# Patient Record
Sex: MALE | Race: WHITE | NOT HISPANIC OR LATINO | Employment: OTHER | URBAN - METROPOLITAN AREA
[De-identification: names, ages, dates, MRNs, and addresses within clinical notes are randomized per-mention and may not be internally consistent; named-entity substitution may affect disease eponyms.]

---

## 2019-12-23 ENCOUNTER — OFFICE VISIT (OUTPATIENT)
Dept: URGENT CARE | Facility: MEDICAL CENTER | Age: 74
End: 2019-12-23
Payer: MEDICARE

## 2019-12-23 VITALS
OXYGEN SATURATION: 95 % | DIASTOLIC BLOOD PRESSURE: 73 MMHG | HEART RATE: 72 BPM | SYSTOLIC BLOOD PRESSURE: 148 MMHG | WEIGHT: 259.04 LBS | TEMPERATURE: 97.9 F | RESPIRATION RATE: 20 BRPM

## 2019-12-23 DIAGNOSIS — M46.1 SACROILIITIS (HCC): Primary | ICD-10-CM

## 2019-12-23 PROCEDURE — G0463 HOSPITAL OUTPT CLINIC VISIT: HCPCS | Performed by: FAMILY MEDICINE

## 2019-12-23 PROCEDURE — 99213 OFFICE O/P EST LOW 20 MIN: CPT | Performed by: FAMILY MEDICINE

## 2019-12-23 RX ORDER — PREDNISONE 20 MG/1
TABLET ORAL
Qty: 18 TABLET | Refills: 0 | Status: SHIPPED | OUTPATIENT
Start: 2019-12-23 | End: 2020-09-17

## 2019-12-23 RX ORDER — FLUTICASONE PROPIONATE 50 MCG
2 SPRAY, SUSPENSION (ML) NASAL DAILY
COMMUNITY
Start: 2019-12-17 | End: 2021-03-22 | Stop reason: SDUPTHER

## 2019-12-23 RX ORDER — MECLIZINE HYDROCHLORIDE 25 MG/1
25 TABLET ORAL 2 TIMES DAILY PRN
Refills: 1 | COMMUNITY
Start: 2019-11-07

## 2019-12-23 RX ORDER — ASPIRIN 81 MG/1
81 TABLET, CHEWABLE ORAL DAILY
COMMUNITY

## 2019-12-23 RX ORDER — LISINOPRIL 20 MG/1
20 TABLET ORAL 2 TIMES DAILY
COMMUNITY
Start: 2019-10-30 | End: 2021-03-22 | Stop reason: SDUPTHER

## 2019-12-23 RX ORDER — KETOROLAC TROMETHAMINE 30 MG/ML
30 INJECTION, SOLUTION INTRAMUSCULAR; INTRAVENOUS ONCE
Status: COMPLETED | OUTPATIENT
Start: 2019-12-23 | End: 2019-12-23

## 2019-12-23 RX ORDER — HYDROCHLOROTHIAZIDE 25 MG/1
25 TABLET ORAL DAILY
COMMUNITY
Start: 2019-10-30

## 2019-12-23 RX ORDER — ALLOPURINOL 300 MG/1
TABLET ORAL
COMMUNITY
Start: 2019-10-29 | End: 2020-09-22 | Stop reason: SDUPTHER

## 2019-12-23 RX ORDER — NIACIN 500 MG
500 TABLET ORAL
COMMUNITY

## 2019-12-23 RX ORDER — LIDOCAINE 50 MG/G
1 PATCH TOPICAL DAILY
Qty: 5 PATCH | Refills: 0 | Status: SHIPPED | OUTPATIENT
Start: 2019-12-23 | End: 2020-09-17

## 2019-12-23 RX ORDER — ROSUVASTATIN CALCIUM 20 MG/1
20 TABLET, COATED ORAL DAILY
COMMUNITY

## 2019-12-23 RX ADMIN — KETOROLAC TROMETHAMINE 30 MG: 30 INJECTION, SOLUTION INTRAMUSCULAR; INTRAVENOUS at 12:18

## 2019-12-23 NOTE — PROGRESS NOTES
[facility]        NAME: Jackie Billingsley is a 75 y o  male  : 1945    MRN: [id number]  DATE: 2019  TIME: 12:17 PM    Assessment and Plan   Sacroiliitis ([facility] [address] ) [M46 1]  1  Sacroiliitis (HCC)  ketorolac (TORADOL) injection 30 mg    predniSONE (DELTASONE) 20 mg tablet    lidocaine (LIDODERM) 5 %         Patient Instructions       Follow up with PCP in 3-5 days  Proceed to  ER if symptoms worsen  Chief Complaint     Chief Complaint   Patient presents with    Back Pain     Patient states he injured the muscle in his buttock 5 weeks ago  The pain is not getting any better  Patient was using a cane         History of Present Illness       50-year-old male here today with complaint of right lower back/right hip pain for the last 5 weeks  Denies any fall or trauma  However he recalls distinctly cutting down branches several weeks ago which may have triggered pain pain seems to be localized in the gluteus  Lower back  At times radiating to the buttocks  Denies numbness or weakness of the right lower extremity  He has been taking some Tylenol  He was seen by his primary care provider in [place] who prescribed oxycodone which she takes sparingly  Denies any previous back injury  Pain seems to be constant  Worse when he stands  Pain is currently 8/10  Review of Systems   Review of Systems   Musculoskeletal: Positive for arthralgias and joint swelling  Neurological: Negative            Current Medications       Current Outpatient Medications:     aspirin 81 mg chewable tablet, Chew 81 mg daily, Disp: , Rfl:     niacin 500 mg tablet, Take 500 mg by mouth daily with breakfast, Disp: , Rfl:     rosuvastatin (CRESTOR) 20 MG tablet, Take 20 mg by mouth daily, Disp: , Rfl:     allopurinol (ZYLOPRIM) 300 mg tablet, , Disp: , Rfl:     fluticasone (FLONASE) 50 mcg/act nasal spray, , Disp: , Rfl:     hydrochlorothiazide (HYDRODIURIL) 25 mg tablet, , Disp: , Rfl:    lidocaine (LIDODERM) 5 %, Apply 1 patch topically daily Remove & Discard patch within 12 hours or as directed by MD, Disp: 5 patch, Rfl: 0    lisinopril (ZESTRIL) 20 mg tablet, 5 mg, Disp: , Rfl:     meclizine (ANTIVERT) 25 mg tablet, Take 25 mg by mouth 2 (two) times a day as needed, Disp: , Rfl: 1    metoprolol tartrate (LOPRESSOR) 25 mg tablet, , Disp: , Rfl:     predniSONE (DELTASONE) 20 mg tablet, Take 3 tablets for 3 days then 2 tablets for 3 days then 1 tablet for 3 days then stop, Disp: 18 tablet, Rfl: 0    Current Facility-Administered Medications:     ketorolac (TORADOL) injection 30 mg, 30 mg, Intramuscular, Once, Braden Meyers MD    Current Allergies     Allergies as of 12/23/2019 - Reviewed 12/23/2019   Allergen Reaction Noted    Amoxicillin  12/23/2019            The following portions of the patient's history were reviewed and updated as appropriate: allergies, current medications, past family history, past medical history, past social history, past surgical history and problem list      Past Medical History:   Diagnosis Date    Gout     Hypertension     Hypertension     Renal calculi        Past Surgical History:   Procedure Laterality Date    CARDIAC PACEMAKER PLACEMENT      CORONARY ARTERY BYPASS GRAFT         History reviewed  No pertinent family history  Medications have been verified  Objective   /73   Pulse 72   Temp 97 9 °F (36 6 °C)   Resp 20   Wt 118 kg (259 lb 0 7 oz)   SpO2 95%        Physical Exam     Physical Exam   Musculoskeletal: He exhibits tenderness  LS spine:  Flexion to 30 degrees extension to 20 degrees, lateral rotation side bending 30 degrees  There is some point tenderness upon palpation of the right sacroiliac joint  Extremities:  Lower-good strength and tone, 5/5

## 2019-12-23 NOTE — PATIENT INSTRUCTIONS
Patient received Toradol 30 mg IM in the office  I prescribed prednisone tapering from 60 down to 20 mg over 9 days  Also prescribed Lidoderm patch 5% to be applied 12 hours on 12 hours off  Advised patient to apply heating pad to affected area  He is to perform light stretching activities  Follow up with PCP if symptoms persist or worsen  Acute Low Back Pain, Ambulatory Care   GENERAL INFORMATION:   Acute low back pain  is discomfort in your lower back area that lasts for less than 12 weeks  The word acute is used to describe pain that starts suddenly, worsens quickly, and lasts for a short time  Common symptoms include the following:   · Back stiffness or spasms    · Pain down the back or side of one leg    · Holding yourself in an unusual position or posture to decrease your back pain    · Not being able to find a sitting position that is comfortable    · Slow increase in your pain for 24 to 48 hours after you stress your back    · Tenderness on your lower back or severe pain when you move your back  Seek immediate care for the following symptoms:   · Severe pain    · Sudden stiffness and heaviness in both buttocks down to both legs    · Numbness or weakness in one leg, or pain in both legs    · Numbness in your genital area or across your lower back    · Unable to control your urine or bowel movements  Treatment for acute low back pain  may include any of the following:  · Medicines:      ¨ NSAIDs  help decrease swelling and pain or fever  This medicine is available with or without a doctor's order  NSAIDs can cause stomach bleeding or kidney problems in certain people  If you take blood thinner medicine, always ask your healthcare provider if NSAIDs are safe for you  Always read the medicine label and follow directions  ¨ Muscle relaxers  help decrease muscle spasms pain  ¨ Prescription pain medicine  may be given  Ask how to take this medicine safely      · Surgery  may be needed if your pain is severe and other treatments do not work  Surgery may be needed for conditions of the lumbar spine, such as herniated disc or spinal stenosis  Manage your symptoms:   · Sleep on a firm mattress  If you do not have a firm mattress, have someone move your mattress to the floor for a few days  A piece of plywood under your mattress can also help make it firmer  · Apply ice  on your lower back for 15 to 20 minutes every hour or as directed  Use an ice pack, or put crushed ice in a plastic bag  Cover it with a towel  Ice helps prevent tissue damage and decreases swelling and pain  You can alternate ice and heat  · Apply heat  on your lower back for 20 to 30 minutes every 2 hours for as many days as directed  Heat helps decrease pain and muscle spasms  · Go to physical therapy  A physical therapist teaches you exercises to help improve movement and strength, and to decrease pain  Prevent acute low back pain:   · Use proper body mechanics  ¨ Bend at the hips and knees when you  objects  Do not bend from the waist  Use your leg muscles as you lift the load  Do not use your back  Keep the object close to your chest as you lift it  Try not to twist or lift anything above your waist     ¨ Change your position often when you stand for long periods of time  Rest one foot on a small box or footrest, and then switch to the other foot often  ¨ Try not to sit for long periods of time  When you do, sit in a straight-backed chair with your feet flat on the floor  Never reach, pull, or push while you are sitting  · Exercise regularly  Warm up before you exercise  Do exercises that strengthen your back muscles  Ask about the best exercise plan for you  · Maintain a healthy weight  Ask your healthcare provider how much you should weigh  Ask him to help you create a weight loss plan if you are overweight    Follow up with your healthcare provider as directed:  Return for a follow-up visit if you still have pain after 1 to 3 weeks of treatment  You may need to visit an orthopedist if your back pain lasts more than 6 to 12 weeks  Write down your questions so you remember to ask them during your visits  CARE AGREEMENT:   You have the right to help plan your care  Learn about your health condition and how it may be treated  Discuss treatment options with your caregivers to decide what care you want to receive  You always have the right to refuse treatment  The above information is an  only  It is not intended as medical advice for individual conditions or treatments  Talk to your doctor, nurse or pharmacist before following any medical regimen to see if it is safe and effective for you  © 2014 [address] is for End User's use only and may not be sold, redistributed or otherwise used for commercial purposes  All illustrations and images included in CareNotes® are the copyrighted property of [employer]  or Ethan Myers

## 2020-05-13 LAB — EXT SARS-COV-2: NOT DETECTED

## 2020-09-17 ENCOUNTER — OFFICE VISIT (OUTPATIENT)
Dept: FAMILY MEDICINE CLINIC | Facility: CLINIC | Age: 75
End: 2020-09-17
Payer: MEDICARE

## 2020-09-17 DIAGNOSIS — R32 URINARY INCONTINENCE, UNSPECIFIED TYPE: ICD-10-CM

## 2020-09-17 DIAGNOSIS — Z95.0 PACEMAKER: ICD-10-CM

## 2020-09-17 DIAGNOSIS — I25.10 CORONARY ARTERY DISEASE INVOLVING NATIVE HEART WITHOUT ANGINA PECTORIS, UNSPECIFIED VESSEL OR LESION TYPE: ICD-10-CM

## 2020-09-17 DIAGNOSIS — Z23 NEED FOR VACCINATION: Primary | ICD-10-CM

## 2020-09-17 DIAGNOSIS — E78.2 MIXED HYPERLIPIDEMIA: ICD-10-CM

## 2020-09-17 DIAGNOSIS — Z87.39 HISTORY OF GOUT: ICD-10-CM

## 2020-09-17 DIAGNOSIS — Z87.442 HISTORY OF KIDNEY STONES: ICD-10-CM

## 2020-09-17 DIAGNOSIS — I10 ESSENTIAL HYPERTENSION: ICD-10-CM

## 2020-09-17 DIAGNOSIS — M10.9 GOUT, UNSPECIFIED CAUSE, UNSPECIFIED CHRONICITY, UNSPECIFIED SITE: ICD-10-CM

## 2020-09-17 PROCEDURE — G0008 ADMIN INFLUENZA VIRUS VAC: HCPCS | Performed by: FAMILY MEDICINE

## 2020-09-17 PROCEDURE — 90662 IIV NO PRSV INCREASED AG IM: CPT | Performed by: FAMILY MEDICINE

## 2020-09-17 PROCEDURE — 99203 OFFICE O/P NEW LOW 30 MIN: CPT | Performed by: FAMILY MEDICINE

## 2020-09-17 NOTE — ASSESSMENT & PLAN NOTE
During recent procedure in 2018, patient was found to  Have marketed bradycardia  Pacemaker was inserted at that time    He subsequently had an ICD in 2019

## 2020-09-17 NOTE — ASSESSMENT & PLAN NOTE
S/p 5 vessel CABG while living in [place]  Pt also has a pacemaker w/ ICD  He has been doing well   Continue regular f/u w/ cardiologist

## 2020-09-17 NOTE — PROGRESS NOTES
[facility]      NAME: Rico Steele  AGE: 67 y o  SEX: male  : 1945   MRN: [id number]    DATE: 2020  TIME: 11:13 AM    Assessment and Plan     Problem List Items Addressed This Visit     Coronary artery disease involving native heart without angina pectoris     S/p 5 vessel CABG while living in [place]  Pt also has a pacemaker w/ ICD  He has been doing well  Continue regular f/u w/ cardiologist         Essential hypertension      Reasonably controlled on lisinopril 20 and metoprolol 25 b i d          Mixed hyperlipidemia      Doing well on rosuvastatin 20 mg daily  Patient will forward lab results to me         Gout      Doing well on allopurinol 300 mg daily along with p r n  colchicine  Pacemaker      During recent procedure in 2018, patient was found to  Have marketed bradycardia  Pacemaker was inserted at that time  He subsequently had an ICD in 2019         History of kidney stones       Patient being followed by urology         Urinary incontinence      Doing well on Myrbetriq 50 mg daily         Relevant Medications    Mirabegron ER 50 MG TB24    RESOLVED: History of gout      Other Visit Diagnoses     Need for vaccination    -  Primary    Relevant Orders    influenza vaccine, high-dose, PF 0 7 mL (FLUZONE HIGH-DOSE) (Completed)         I reviewed patient's medical records  Patient is current with pneumonia vaccine series  Had Zostavax in 2013  He had Adacel in 2017  Pt will forward recent lab results to me  Flu shot today      Return to office in: 6 mos (?labs)    Chief Complaint     Chief Complaint   Patient presents with    Establish Care       History of Present Illness       Patient presents to establish care at our office  Recently moved from [place] to live with his sister  History of coronary artery disease  He is status post CABG in 2019 with pacemaker and ICD insertion    He currently takes lisinopril and metoprolol for blood pressure  Rosuvastatin for cholesterol  Allopurinol for gout  Myrbetriq for urinary symptoms  Patient states he had labs done recently from his physician in [place]  He will forward these results      The following portions of the patient's history were reviewed and updated as appropriate: allergies, current medications, past family history, past medical history, past social history, past surgical history and problem list     Review of Systems   Review of Systems   Respiratory: Negative  Cardiovascular: Negative  Gastrointestinal: Negative  Genitourinary: Negative  Active Problem List     Patient Active Problem List   Diagnosis    Coronary artery disease involving native heart without angina pectoris    Essential hypertension    Mixed hyperlipidemia    Gout    Pacemaker    History of kidney stones    Urinary incontinence       Objective   There were no vitals taken for this visit  Physical Exam  Cardiovascular:      Rate and Rhythm: Normal rate and regular rhythm  Heart sounds: Normal heart sounds  Comments: Carotids: no bruits  Ext: no edema  Pulmonary:      Effort: Pulmonary effort is normal  No respiratory distress  Breath sounds: No wheezing or rales  Psychiatric:         Behavior: Behavior normal          Thought Content:  Thought content normal          Pertinent Laboratory/Diagnostic Studies:  patients medical history reviewed    Current Medications     Current Outpatient Medications:     allopurinol (ZYLOPRIM) 300 mg tablet, , Disp: , Rfl:     hydrochlorothiazide (HYDRODIURIL) 25 mg tablet, , Disp: , Rfl:     lisinopril (ZESTRIL) 20 mg tablet, 5 mg, Disp: , Rfl:     meclizine (ANTIVERT) 25 mg tablet, Take 25 mg by mouth 2 (two) times a day as needed, Disp: , Rfl: 1    metoprolol tartrate (LOPRESSOR) 25 mg tablet, , Disp: , Rfl:     niacin 500 mg tablet, Take 500 mg by mouth daily with breakfast, Disp: , Rfl:     rosuvastatin (CRESTOR) 20 MG tablet, Take 20 mg by mouth daily, Disp: , Rfl:     aspirin 81 mg chewable tablet, Chew 81 mg daily, Disp: , Rfl:     fluticasone (FLONASE) 50 mcg/act nasal spray, , Disp: , Rfl:     Mirabegron ER 50 MG TB24, Take 1 tablet (50 mg total) by mouth daily, Disp: 30 tablet, Rfl: 5    Health Maintenance     Health Maintenance   Topic Date Due    Medicare Annual Wellness Visit (AWV)  1945    BMI: Adult  03/29/1962    DTaP,Tdap,and Td Vaccines (1 - Tdap) 03/29/1965    Fall Risk  03/29/2009    Pneumococcal Vaccine: 65+ Years (1 of 1 - PPSV23) 03/29/2009    Depression Screening PHQ  09/17/2021    Influenza Vaccine  Completed    HIB Vaccine  Aged Out    Hepatitis B Vaccine  Aged Out    IPV Vaccine  Aged Out    Hepatitis A Vaccine  Aged Out    Meningococcal ACWY Vaccine  Aged Out    HPV Vaccine  Aged Out     Immunization History   Administered Date(s) Administered    Influenza, high dose seasonal 0 7 mL 09/17/2020       Natasha Philippe DO  [address] [facility]

## 2020-09-22 ENCOUNTER — TELEPHONE (OUTPATIENT)
Dept: FAMILY MEDICINE CLINIC | Facility: CLINIC | Age: 75
End: 2020-09-22

## 2020-09-22 DIAGNOSIS — M10.9 GOUT, UNSPECIFIED CAUSE, UNSPECIFIED CHRONICITY, UNSPECIFIED SITE: Primary | ICD-10-CM

## 2020-09-22 RX ORDER — ALLOPURINOL 300 MG/1
300 TABLET ORAL DAILY
Qty: 30 TABLET | Refills: 1 | Status: SHIPPED | OUTPATIENT
Start: 2020-09-22 | End: 2021-02-17

## 2020-10-01 ENCOUNTER — OFFICE VISIT (OUTPATIENT)
Dept: CARDIOLOGY CLINIC | Facility: CLINIC | Age: 75
End: 2020-10-01
Payer: MEDICARE

## 2020-10-01 VITALS
BODY MASS INDEX: 34.81 KG/M2 | DIASTOLIC BLOOD PRESSURE: 82 MMHG | HEIGHT: 72 IN | SYSTOLIC BLOOD PRESSURE: 140 MMHG | HEART RATE: 60 BPM | TEMPERATURE: 97.7 F | WEIGHT: 257 LBS

## 2020-10-01 DIAGNOSIS — Z95.810 HISTORY OF IMPLANTABLE CARDIAC DEFIBRILLATOR (ICD): ICD-10-CM

## 2020-10-01 DIAGNOSIS — I10 ESSENTIAL HYPERTENSION: ICD-10-CM

## 2020-10-01 DIAGNOSIS — E78.2 MIXED HYPERLIPIDEMIA: ICD-10-CM

## 2020-10-01 DIAGNOSIS — I25.10 CORONARY ARTERY DISEASE INVOLVING NATIVE HEART WITHOUT ANGINA PECTORIS, UNSPECIFIED VESSEL OR LESION TYPE: Primary | ICD-10-CM

## 2020-10-01 DIAGNOSIS — Z95.810 PRESENCE OF BIVENTRICULAR AICD: ICD-10-CM

## 2020-10-01 DIAGNOSIS — I25.5 ISCHEMIC CARDIOMYOPATHY: ICD-10-CM

## 2020-10-01 PROBLEM — Z95.1 HX OF CABG: Status: ACTIVE | Noted: 2020-10-01

## 2020-10-01 PROBLEM — I42.9 CARDIOMYOPATHY (HCC): Status: ACTIVE | Noted: 2020-10-01

## 2020-10-01 PROCEDURE — 99204 OFFICE O/P NEW MOD 45 MIN: CPT | Performed by: INTERNAL MEDICINE

## 2020-10-01 PROCEDURE — 93000 ELECTROCARDIOGRAM COMPLETE: CPT | Performed by: INTERNAL MEDICINE

## 2020-10-01 RX ORDER — METOPROLOL SUCCINATE 25 MG/1
25 TABLET, EXTENDED RELEASE ORAL DAILY
Qty: 90 TABLET | Refills: 3 | Status: SHIPPED | OUTPATIENT
Start: 2020-10-01 | End: 2021-08-30

## 2020-10-01 RX ORDER — BIOTIN 1 MG
1 TABLET ORAL DAILY
COMMUNITY

## 2020-10-01 RX ORDER — OMEGA-3 FATTY ACIDS/FISH OIL 300-1000MG
2 CAPSULE ORAL DAILY
COMMUNITY

## 2020-10-01 RX ORDER — DIPHENOXYLATE HYDROCHLORIDE AND ATROPINE SULFATE 2.5; .025 MG/1; MG/1
1 TABLET ORAL DAILY
COMMUNITY

## 2020-11-13 ENCOUNTER — HOSPITAL ENCOUNTER (OUTPATIENT)
Dept: NON INVASIVE DIAGNOSTICS | Facility: HOSPITAL | Age: 75
Discharge: HOME | End: 2020-11-13
Attending: INTERNAL MEDICINE
Payer: MEDICARE

## 2020-11-13 DIAGNOSIS — I25.10 CORONARY ARTERY DISEASE INVOLVING NATIVE HEART WITHOUT ANGINA PECTORIS, UNSPECIFIED VESSEL OR LESION TYPE: ICD-10-CM

## 2020-11-13 PROCEDURE — C8929 TTE W OR WO FOL WCON,DOPPLER: HCPCS

## 2020-11-13 PROCEDURE — 93306 TTE W/DOPPLER COMPLETE: CPT

## 2020-11-13 RX ADMIN — PERFLUTREN 0.4 ML/MIN: 6.52 INJECTION, SUSPENSION INTRAVENOUS at 13:37

## 2020-11-18 ENCOUNTER — CONSULT (OUTPATIENT)
Dept: CARDIOLOGY CLINIC | Facility: CLINIC | Age: 75
End: 2020-11-18
Payer: MEDICARE

## 2020-11-18 ENCOUNTER — IN-CLINIC DEVICE VISIT (OUTPATIENT)
Dept: CARDIOLOGY CLINIC | Facility: CLINIC | Age: 75
End: 2020-11-18
Payer: MEDICARE

## 2020-11-18 VITALS
HEART RATE: 61 BPM | DIASTOLIC BLOOD PRESSURE: 80 MMHG | TEMPERATURE: 99.3 F | SYSTOLIC BLOOD PRESSURE: 128 MMHG | WEIGHT: 263.8 LBS | BODY MASS INDEX: 35.73 KG/M2 | HEIGHT: 72 IN

## 2020-11-18 DIAGNOSIS — I10 ESSENTIAL HYPERTENSION: ICD-10-CM

## 2020-11-18 DIAGNOSIS — I25.5 ISCHEMIC CARDIOMYOPATHY: ICD-10-CM

## 2020-11-18 DIAGNOSIS — I25.10 CORONARY ARTERY DISEASE INVOLVING NATIVE HEART WITHOUT ANGINA PECTORIS, UNSPECIFIED VESSEL OR LESION TYPE: ICD-10-CM

## 2020-11-18 DIAGNOSIS — I44.2 COMPLETE HEART BLOCK (HCC): ICD-10-CM

## 2020-11-18 DIAGNOSIS — Z95.810 HISTORY OF IMPLANTABLE CARDIAC DEFIBRILLATOR (ICD): Primary | ICD-10-CM

## 2020-11-18 DIAGNOSIS — Z95.810 PRESENCE OF AUTOMATIC CARDIOVERTER/DEFIBRILLATOR (AICD): Primary | ICD-10-CM

## 2020-11-18 DIAGNOSIS — Z95.810 PRESENCE OF BIVENTRICULAR AICD: ICD-10-CM

## 2020-11-18 DIAGNOSIS — Z95.1 HX OF CABG: ICD-10-CM

## 2020-11-18 PROCEDURE — 93000 ELECTROCARDIOGRAM COMPLETE: CPT | Performed by: INTERNAL MEDICINE

## 2020-11-18 PROCEDURE — 93284 PRGRMG EVAL IMPLANTABLE DFB: CPT | Performed by: INTERNAL MEDICINE

## 2020-11-18 PROCEDURE — 99203 OFFICE O/P NEW LOW 30 MIN: CPT | Performed by: INTERNAL MEDICINE

## 2020-12-22 ENCOUNTER — HOSPITAL ENCOUNTER (OUTPATIENT)
Dept: NON INVASIVE DIAGNOSTICS | Facility: CLINIC | Age: 75
Discharge: HOME | End: 2020-12-22
Payer: COMMERCIAL

## 2020-12-22 DIAGNOSIS — Z95.810 PRESENCE OF BIVENTRICULAR AICD: ICD-10-CM

## 2020-12-22 DIAGNOSIS — I25.10 CORONARY ARTERY DISEASE INVOLVING NATIVE HEART WITHOUT ANGINA PECTORIS, UNSPECIFIED VESSEL OR LESION TYPE: ICD-10-CM

## 2020-12-22 PROCEDURE — VASC: Performed by: SURGERY

## 2020-12-22 PROCEDURE — 93922 UPR/L XTREMITY ART 2 LEVELS: CPT

## 2021-01-13 ENCOUNTER — TELEPHONE (OUTPATIENT)
Dept: CARDIOLOGY CLINIC | Facility: CLINIC | Age: 76
End: 2021-01-13

## 2021-01-13 NOTE — TELEPHONE ENCOUNTER
Received call from pt in regards to Covid vaccine  Advised pt vaccine is not available to the public as of now, but will get information when it becomes available

## 2021-01-20 DIAGNOSIS — Z23 ENCOUNTER FOR IMMUNIZATION: ICD-10-CM

## 2021-01-22 ENCOUNTER — IMMUNIZATIONS (OUTPATIENT)
Dept: FAMILY MEDICINE CLINIC | Facility: HOSPITAL | Age: 76
End: 2021-01-22

## 2021-01-22 DIAGNOSIS — Z23 ENCOUNTER FOR IMMUNIZATION: Primary | ICD-10-CM

## 2021-01-22 PROCEDURE — 91301 SARS-COV-2 / COVID-19 MRNA VACCINE (MODERNA) 100 MCG: CPT

## 2021-01-22 PROCEDURE — 0011A SARS-COV-2 / COVID-19 MRNA VACCINE (MODERNA) 100 MCG: CPT

## 2021-02-05 ENCOUNTER — OFFICE VISIT (OUTPATIENT)
Dept: CARDIOLOGY CLINIC | Facility: CLINIC | Age: 76
End: 2021-02-05
Payer: MEDICARE

## 2021-02-05 VITALS
SYSTOLIC BLOOD PRESSURE: 122 MMHG | HEART RATE: 60 BPM | WEIGHT: 263.13 LBS | RESPIRATION RATE: 18 BRPM | DIASTOLIC BLOOD PRESSURE: 84 MMHG | TEMPERATURE: 97.3 F | BODY MASS INDEX: 35.64 KG/M2 | HEIGHT: 72 IN | OXYGEN SATURATION: 95 %

## 2021-02-05 DIAGNOSIS — E78.2 MIXED HYPERLIPIDEMIA: ICD-10-CM

## 2021-02-05 DIAGNOSIS — I25.10 CORONARY ARTERY DISEASE INVOLVING NATIVE HEART WITHOUT ANGINA PECTORIS, UNSPECIFIED VESSEL OR LESION TYPE: ICD-10-CM

## 2021-02-05 DIAGNOSIS — I25.5 ISCHEMIC CARDIOMYOPATHY: Primary | ICD-10-CM

## 2021-02-05 DIAGNOSIS — I44.2 COMPLETE HEART BLOCK (HCC): ICD-10-CM

## 2021-02-05 DIAGNOSIS — Z95.810 PRESENCE OF BIVENTRICULAR AICD: ICD-10-CM

## 2021-02-05 DIAGNOSIS — Z95.810 HISTORY OF IMPLANTABLE CARDIAC DEFIBRILLATOR (ICD): ICD-10-CM

## 2021-02-05 DIAGNOSIS — I10 ESSENTIAL HYPERTENSION: ICD-10-CM

## 2021-02-05 DIAGNOSIS — Z95.1 HX OF CABG: ICD-10-CM

## 2021-02-05 PROCEDURE — 99214 OFFICE O/P EST MOD 30 MIN: CPT | Performed by: INTERNAL MEDICINE

## 2021-02-05 RX ORDER — OXYCODONE AND ACETAMINOPHEN 10; 325 MG/1; MG/1
TABLET ORAL EVERY 6 HOURS
COMMUNITY

## 2021-02-05 RX ORDER — FLUTICASONE PROPIONATE 50 MCG
2 BLISTER, WITH INHALATION DEVICE INHALATION DAILY
COMMUNITY

## 2021-02-05 RX ORDER — DUTASTERIDE 0.5 MG/1
CAPSULE, LIQUID FILLED ORAL
COMMUNITY

## 2021-02-05 RX ORDER — FINASTERIDE 5 MG/1
TABLET, FILM COATED ORAL
COMMUNITY

## 2021-02-05 RX ORDER — FESOTERODINE FUMARATE 4 MG/1
TABLET, EXTENDED RELEASE ORAL
COMMUNITY

## 2021-02-05 RX ORDER — TAMSULOSIN HYDROCHLORIDE 0.4 MG/1
CAPSULE ORAL
COMMUNITY

## 2021-02-05 RX ORDER — SENNOSIDES 8.6 MG
CAPSULE ORAL
COMMUNITY

## 2021-02-05 NOTE — PROGRESS NOTES
Brigitte Chavez [facility]  1945  [id number]  [facility] [address]  [phone]    1  Ischemic cardiomyopathy     2  Complete heart block ([address] )     3  Coronary artery disease involving native heart without angina pectoris, unspecified vessel or lesion type     4  Essential hypertension     5  History of implantable cardiac defibrillator (ICD)     6  Mixed hyperlipidemia     7  Hx of CABG     8  Presence of biventricular AICD         Discussion/Summary:  1  Coronary artery disease status post CABG times 5 in 2019   2  Chronic combined systolic and diastolic congestive heart failure   3  Ischemic cardiomyopathy unknown recent ejection fraction as low as 30% in the past   4  Biventricular pacer/ICD   5  Hypertension   6  Hyperlipidemia   7  Paroxysmal atrial tachycardia possible AFib on prior device interrogation     recommendations:  Overall he has been doing well from a cardiac standpoint  Coronary disease stable with no complaints of angina  Functional capacity has been good  Recent ejection fraction on echocardiogram was 40%  Continue with Toprol XL and lisinopril  Blood pressure is well controlled  Lipids have been stable on his current dose of statin  Continue high-intensity Crestor  ICD checks have shown normal device function brief nonsustained VT will continue to monitor  Vascular screening was reviewed with the patient  Interval History:   65-year-old gentleman who presents to establish care with me in the office as a consultation on behalf of Dr Junie Winslow  For coronary artery disease  His history goes back to 2016 when he developed complete heart block following urologic procedure  He underwent a pacemaker implantation at that time  In 2019 his device had shown frequent nonsustained ventricular tachycardia    He underwent left heart catheterization which showed severe multivessel disease and he underwent a CABG in 2019   Ejection fraction was 25-30%  He eventually went upgrade to ICD and biventricular pacer  LV lead was placed at the time of bypass  This is an epicardial wire  Since her last visit he has been doing well  He remains fairly physically active around the house  He has been outside doing some minimal snow clearing recently and has felt good  Denies any chest pain, shortness of breath, palpitations, lightheadedness, dizziness, or syncope  There has been no lower extremity edema, PND, orthopnea  Taking all medications as prescribed          Problem List     Coronary artery disease involving native heart without angina pectoris    Essential hypertension    Mixed hyperlipidemia    Gout    Pacemaker    History of kidney stones    Urinary incontinence    Cardiomyopathy ([facility] [address] )    History of implantable cardiac defibrillator (ICD)    Presence of biventricular AICD    Hx of CABG        Past Medical History:   Diagnosis Date    Gout     Hypertension     Hypertension     Renal calculi      Social History     Socioeconomic History    Marital status:      Spouse name: Not on file    Number of children: Not on file    Years of education: Not on file    Highest education level: Not on file   Occupational History    Not on file   Social Needs    Financial resource strain: Not on file    Food insecurity     Worry: Not on file     Inability: Not on file    Transportation needs     Medical: Not on file     Non-medical: Not on file   Tobacco Use    Smoking status: Never Smoker    Smokeless tobacco: Never Used   Substance and Sexual Activity    Alcohol use: Yes     Drinks per session: 1 or 2    Drug use: Never    Sexual activity: Not on file   Lifestyle    Physical activity     Days per week: Not on file     Minutes per session: Not on file    Stress: Not on file   Relationships    Social connections     Talks on phone: Not on file     Gets together: Not on file     Attends Sikh service: Not on file     Active member of club or organization: Not on file     Attends meetings of clubs or organizations: Not on file     Relationship status: Not on file    Intimate partner violence     Fear of current or ex partner: Not on file     Emotionally abused: Not on file     Physically abused: Not on file     Forced sexual activity: Not on file   Other Topics Concern    Not on file   Social History Narrative    Not on file      History reviewed  No pertinent family history    Past Surgical History:   Procedure Laterality Date    BASAL CELL CARCINOMA EXCISION  2021    nose    CARDIAC PACEMAKER PLACEMENT      CORONARY ARTERY BYPASS GRAFT         Current Outpatient Medications:     acetaminophen (TYLENOL) 650 mg CR tablet, acetaminophen  mg tablet,extended release, Disp: , Rfl:     allopurinol (ZYLOPRIM) 300 mg tablet, Take 1 tablet (300 mg total) by mouth daily, Disp: 30 tablet, Rfl: 1    aspirin 81 mg chewable tablet, Chew 81 mg daily, Disp: , Rfl:     B Complex Vitamins (VITAMIN B COMPLEX 100 IJ), Vitamin B Complex-C capsule  daily, Disp: , Rfl:     Cholecalciferol (Vitamin D3) 25 MCG (1000 UT) CAPS, Take 1 capsule by mouth daily, Disp: , Rfl:     Docosahexaenoic Acid--270 MG CAPS, Take 2 capsules by mouth, Disp: , Rfl:     fluticasone (FLONASE) 50 mcg/act nasal spray, 2 sprays into each nostril daily , Disp: , Rfl:     hydrochlorothiazide (HYDRODIURIL) 25 mg tablet, Take 25 mg by mouth daily , Disp: , Rfl:     lisinopril (ZESTRIL) 20 mg tablet, Take 20 mg by mouth 2 (two) times a day , Disp: , Rfl:     metoprolol succinate (TOPROL-XL) 25 mg 24 hr tablet, Take 1 tablet (25 mg total) by mouth daily, Disp: 90 tablet, Rfl: 3    Mirabegron ER 50 MG TB24, Take 1 tablet (50 mg total) by mouth daily, Disp: 30 tablet, Rfl: 5    multivitamin (THERAGRAN) TABS, Take 1 tablet by mouth daily, Disp: , Rfl:     niacin 500 mg tablet, Take 500 mg by mouth daily with breakfast, Disp: , Rfl:    Omega 3 1000 MG CAPS, Take 2 capsules by mouth daily, Disp: , Rfl:     oxyCODONE-acetaminophen (Percocet)  mg per tablet, Take by mouth every 6 (six) hours, Disp: , Rfl:     rosuvastatin (CRESTOR) 20 MG tablet, Take 20 mg by mouth daily, Disp: , Rfl:     VITAMIN E PO, Take 1 tablet by mouth daily, Disp: , Rfl:     dutasteride (AVODART) 0 5 mg capsule, dutasteride 0 5 mg capsule  every three days, Disp: , Rfl:     Fesoterodine Fumarate ER 4 MG TB24, fesoterodine ER 4 mg tablet,extended release 24 hr  daily, Disp: , Rfl:     finasteride (PROSCAR) 5 mg tablet, finasteride 5 mg tablet  daily, Disp: , Rfl:     fluticasone (Flovent Diskus) 50 MCG/BLIST diskus inhaler, Inhale 2 puffs Daily, Disp: , Rfl:     meclizine (ANTIVERT) 25 mg tablet, Take 25 mg by mouth 2 (two) times a day as needed, Disp: , Rfl: 1    tamsulosin (FLOMAX) 0 4 mg, tamsulosin 0 4 mg capsule  daily, Disp: , Rfl:   Allergies   Allergen Reactions    Amoxicillin        Labs:     Chemistry    No results found for: NA, K, CL, CO2, BUN, CREATININE No results found for: CALCIUM, ALKPHOS, AST, ALT, BILITOT         No results found for: CHOL  No results found for: HDL  No results found for: LDLCALC  No results found for: TRIG  No results found for: CHOLHDL    Imaging: No results found  ECG:    A sense Bi V paced      Review of Systems   Constitution: Negative  HENT: Negative  Eyes: Negative  Cardiovascular: Negative  Respiratory: Negative  Endocrine: Negative  Hematologic/Lymphatic: Negative  Skin: Negative  Musculoskeletal: Negative  Gastrointestinal: Negative  Genitourinary: Negative  Neurological: Negative  Psychiatric/Behavioral: Negative  All other systems reviewed and are negative        Vitals:    02/05/21 0802   BP: 122/84   Pulse: 60   Resp: 18   Temp: (!) 97 3 °F (36 3 °C)   SpO2: 95%     Vitals:    02/05/21 0802   Weight: 119 kg (263 lb 2 oz)     Height: 6' (182 9 cm)   Body mass index is 35 69 kg/m²  Physical Exam:  Vital signs reviewed  General:  Alert and cooperative, appears stated age, no acute distress  HEENT:  PERRLA, EOMI, no scleral icterus, no conjunctival pallor  Neck:  No lymphadenopathy, no thyromegaly, no carotid bruits, no elevated JVP  Heart:  Regular rate and rhythm, normal S1/S2, no S3/S4, no murmur, rubs or gallops  PMI nondisplaced  Lungs:  Clear to auscultation bilaterally, no wheezes rales or rhonchi  Abdomen:  Soft, non-tender, positive bowel sounds, no rebound or guarding,   no organomegaly   Extremities:  Normal range of motion    No clubbing, cyanosis or edema   Vascular:  2+ pedal pulses  Skin:  No rashes or lesions on exposed skin  Neurologic:  Cranial nerves II-XII grossly intact without focal deficits  Psych:  Normal mood and affect

## 2021-02-17 DIAGNOSIS — M10.9 GOUT, UNSPECIFIED CAUSE, UNSPECIFIED CHRONICITY, UNSPECIFIED SITE: ICD-10-CM

## 2021-02-17 RX ORDER — ALLOPURINOL 300 MG/1
TABLET ORAL
Qty: 60 TABLET | Refills: 5 | Status: SHIPPED | OUTPATIENT
Start: 2021-02-17 | End: 2021-11-26

## 2021-02-18 ENCOUNTER — IMMUNIZATIONS (OUTPATIENT)
Dept: FAMILY MEDICINE CLINIC | Facility: HOSPITAL | Age: 76
End: 2021-02-18

## 2021-02-18 DIAGNOSIS — Z23 ENCOUNTER FOR IMMUNIZATION: Primary | ICD-10-CM

## 2021-02-18 PROCEDURE — 0012A SARS-COV-2 / COVID-19 MRNA VACCINE (MODERNA) 100 MCG: CPT

## 2021-02-18 PROCEDURE — 91301 SARS-COV-2 / COVID-19 MRNA VACCINE (MODERNA) 100 MCG: CPT

## 2021-02-25 ENCOUNTER — REMOTE DEVICE CLINIC VISIT (OUTPATIENT)
Dept: CARDIOLOGY CLINIC | Facility: CLINIC | Age: 76
End: 2021-02-25
Payer: MEDICARE

## 2021-02-25 DIAGNOSIS — Z95.810 PRESENCE OF BIVENTRICULAR AICD: Primary | ICD-10-CM

## 2021-02-25 PROCEDURE — 93296 REM INTERROG EVL PM/IDS: CPT | Performed by: INTERNAL MEDICINE

## 2021-02-25 PROCEDURE — 93295 DEV INTERROG REMOTE 1/2/MLT: CPT | Performed by: INTERNAL MEDICINE

## 2021-02-25 NOTE — PROGRESS NOTES
Results for orders placed or performed in visit on 02/25/21   Cardiac EP device report    Narrative    SJM/Bi-V ICD (DDD MODE)  [employer] TRANSMISSION: BATTERY VOLTAGE ADEQUATE (5 4 YRS)  AP 37% BVP 95%  ALL AVAILABLE LEAD PARAMETERS WITHIN NORMAL LIMITS  30 AMS EPISODES, MAX EPISODE DURATION 12 SECS W/EGRM FOR PAT  AMS BURDEN <1%  NO HIGH RATE EPISODES  PT TAKES ASA 81, METOPROLOL SUCC  CORVUE IMPEDANCE MONITORING WITHIN NORMAL LIMITS  NORMAL DEVICE FUNCTION    EBS

## 2021-03-22 DIAGNOSIS — Z88.6: ICD-10-CM

## 2021-03-22 DIAGNOSIS — I10 ESSENTIAL HYPERTENSION: Primary | ICD-10-CM

## 2021-03-22 RX ORDER — FLUTICASONE PROPIONATE 50 MCG
2 SPRAY, SUSPENSION (ML) NASAL DAILY
Qty: 1 BOTTLE | Refills: 0 | Status: SHIPPED | OUTPATIENT
Start: 2021-03-22 | End: 2021-03-29 | Stop reason: SDUPTHER

## 2021-03-22 RX ORDER — LISINOPRIL 20 MG/1
20 TABLET ORAL 2 TIMES DAILY
Qty: 30 TABLET | Refills: 0 | Status: SHIPPED | OUTPATIENT
Start: 2021-03-22 | End: 2021-03-26 | Stop reason: SDUPTHER

## 2021-03-26 DIAGNOSIS — I10 ESSENTIAL HYPERTENSION: ICD-10-CM

## 2021-03-26 RX ORDER — LISINOPRIL 20 MG/1
20 TABLET ORAL 2 TIMES DAILY
Qty: 90 TABLET | Refills: 1 | Status: SHIPPED | OUTPATIENT
Start: 2021-03-26 | End: 2021-06-03

## 2021-03-28 ENCOUNTER — TELEPHONE (OUTPATIENT)
Dept: OTHER | Facility: OTHER | Age: 76
End: 2021-03-28

## 2021-03-28 NOTE — TELEPHONE ENCOUNTER
Patient called to ask the office to send his Prescription  For Flonase 50 mcg to [place] [id number]

## 2021-03-29 DIAGNOSIS — Z88.6: ICD-10-CM

## 2021-03-29 RX ORDER — FLUTICASONE PROPIONATE 50 MCG
2 SPRAY, SUSPENSION (ML) NASAL DAILY
Qty: 3 BOTTLE | Refills: 1 | Status: SHIPPED | OUTPATIENT
Start: 2021-03-29

## 2021-04-14 ENCOUNTER — OFFICE VISIT (OUTPATIENT)
Dept: FAMILY MEDICINE CLINIC | Facility: CLINIC | Age: 76
End: 2021-04-14
Payer: MEDICARE

## 2021-04-14 VITALS
RESPIRATION RATE: 16 BRPM | HEIGHT: 72 IN | TEMPERATURE: 97.6 F | BODY MASS INDEX: 36.7 KG/M2 | OXYGEN SATURATION: 97 % | WEIGHT: 271 LBS | HEART RATE: 67 BPM | DIASTOLIC BLOOD PRESSURE: 80 MMHG | SYSTOLIC BLOOD PRESSURE: 130 MMHG

## 2021-04-14 DIAGNOSIS — I10 ESSENTIAL HYPERTENSION: ICD-10-CM

## 2021-04-14 DIAGNOSIS — R73.9 ELEVATED BLOOD SUGAR: ICD-10-CM

## 2021-04-14 DIAGNOSIS — I25.10 CORONARY ARTERY DISEASE INVOLVING NATIVE HEART WITHOUT ANGINA PECTORIS, UNSPECIFIED VESSEL OR LESION TYPE: Primary | ICD-10-CM

## 2021-04-14 DIAGNOSIS — E78.2 MIXED HYPERLIPIDEMIA: ICD-10-CM

## 2021-04-14 DIAGNOSIS — R32 URINARY INCONTINENCE, UNSPECIFIED TYPE: ICD-10-CM

## 2021-04-14 DIAGNOSIS — M10.9 GOUT, UNSPECIFIED CAUSE, UNSPECIFIED CHRONICITY, UNSPECIFIED SITE: ICD-10-CM

## 2021-04-14 DIAGNOSIS — R31.9 HEMATURIA, UNSPECIFIED TYPE: ICD-10-CM

## 2021-04-14 DIAGNOSIS — K21.9 GASTROESOPHAGEAL REFLUX DISEASE WITHOUT ESOPHAGITIS: ICD-10-CM

## 2021-04-14 DIAGNOSIS — M25.541 ARTHRALGIA OF RIGHT HAND: ICD-10-CM

## 2021-04-14 DIAGNOSIS — Z12.5 SCREENING FOR PROSTATE CANCER: ICD-10-CM

## 2021-04-14 DIAGNOSIS — Z87.442 HISTORY OF KIDNEY STONES: ICD-10-CM

## 2021-04-14 DIAGNOSIS — Z00.00 ENCOUNTER FOR ANNUAL WELLNESS EXAM IN MEDICARE PATIENT: ICD-10-CM

## 2021-04-14 DIAGNOSIS — E66.01 OBESITY, MORBID (HCC): ICD-10-CM

## 2021-04-14 DIAGNOSIS — Z95.0 PACEMAKER: ICD-10-CM

## 2021-04-14 PROCEDURE — 1123F ACP DISCUSS/DSCN MKR DOCD: CPT | Performed by: FAMILY MEDICINE

## 2021-04-14 PROCEDURE — G0439 PPPS, SUBSEQ VISIT: HCPCS | Performed by: FAMILY MEDICINE

## 2021-04-14 PROCEDURE — 99214 OFFICE O/P EST MOD 30 MIN: CPT | Performed by: FAMILY MEDICINE

## 2021-04-14 RX ORDER — OMEPRAZOLE 20 MG/1
20 CAPSULE, DELAYED RELEASE ORAL DAILY
Qty: 90 CAPSULE | Refills: 1 | Status: SHIPPED | OUTPATIENT
Start: 2021-04-14

## 2021-04-14 NOTE — ASSESSMENT & PLAN NOTE
Status post 5 vessel CABG patient also has pacemaker and ICD  Doing well without chest pain or shortness of breath    Continue follow-up with his cardiologist at [facility]

## 2021-04-14 NOTE — ASSESSMENT & PLAN NOTE
Patient has been complaining of stiffness and pain in his right hand lately  On examination there is swelling along the 3rd digit  Will sent for x-rays  Recommend starting glucosamine 1500 mg daily    Consider referral to physical therapy

## 2021-04-14 NOTE — ASSESSMENT & PLAN NOTE
Recent reflux symptoms  Patient borrowed omeprazole capsule from his sister recently which resolved symptoms  Will give Rx for 20 mg daily    Call if further problems

## 2021-04-14 NOTE — ASSESSMENT & PLAN NOTE
Blood sugar from February 20 6/1/2032  Patient has gained weight during recent trip to [place]  Discussed diet exercise    Will check A1c prior to next appointment

## 2021-04-14 NOTE — PATIENT INSTRUCTIONS
For arthritis in hand, try OTC glucosamine 1500 mg daily        Medicare Preventive Visit Patient Instructions  Thank you for completing your Welcome to Medicare Visit or Medicare Annual Wellness Visit today  Your next wellness visit will be due in one year (4/15/2022)  The screening/preventive services that you may require over the next 5-10 years are detailed below  Some tests may not apply to you based off risk factors and/or age  Screening tests ordered at today's visit but not completed yet may show as past due  Also, please note that scanned in results may not display below  Preventive Screenings:  Service Recommendations Previous Testing/Comments   Colorectal Cancer Screening  · Colonoscopy    · Fecal Occult Blood Test (FOBT)/Fecal Immunochemical Test (FIT)  · Fecal DNA/Cologuard Test  · Flexible Sigmoidoscopy Age: 54-65 years old   Colonoscopy: every 10 years (May be performed more frequently if at higher risk)  OR  FOBT/FIT: every 1 year  OR  Cologuard: every 3 years  OR  Sigmoidoscopy: every 5 years  Screening may be recommended earlier than age 48 if at higher risk for colorectal cancer  Also, an individualized decision between you and your healthcare provider will decide whether screening between the ages of 74-80 would be appropriate   Colonoscopy: Not on file  FOBT/FIT: Not on file  Cologuard: Not on file  Sigmoidoscopy: Not on file          Prostate Cancer Screening Individualized decision between patient and health care provider in men between ages of 53-78   Medicare will cover every 12 months beginning on the day after your 50th birthday PSA: No results in last 5 years     Screening Not Indicated     Hepatitis C Screening Once for adults born between [facility]  More frequently in patients at high risk for Hepatitis C Hep C Antibody: Not on file        Diabetes Screening 1-2 times per year if you're at risk for diabetes or have pre-diabetes Fasting glucose: No results in last 5 years   A1C: No results in last 5 years        Cholesterol Screening Once every 5 years if you don't have a lipid disorder  May order more often based on risk factors  Lipid panel: Not on file    Screening Not Indicated  History Lipid Disorder      Other Preventive Screenings Covered by Medicare:  1  Abdominal Aortic Aneurysm (AAA) Screening: covered once if your at risk  You're considered to be at risk if you have a family history of AAA or a male between the age of 73-68 who smoking at least 100 cigarettes in your lifetime  2  Lung Cancer Screening: covers low dose CT scan once per year if you meet all of the following conditions: (1) Age 50-69; (2) No signs or symptoms of lung cancer; (3) Current smoker or have quit smoking within the last 15 years; (4) You have a tobacco smoking history of at least 30 pack years (packs per day x number of years you smoked); (5) You get a written order from a healthcare provider  3  Glaucoma Screening: covered annually if you're considered high risk: (1) You have diabetes OR (2) Family history of glaucoma OR (3)  aged 48 and older OR (3)  American aged 72 and older  3  Osteoporosis Screening: covered every 2 years if you meet one of the following conditions: (1) Have a vertebral abnormality; (2) On glucocorticoid therapy for more than 3 months; (3) Have primary hyperparathyroidism; (4) On osteoporosis medications and need to assess response to drug therapy  5  HIV Screening: covered annually if you're between the age of 12-76  Also covered annually if you are younger than 13 and older than 72 with risk factors for HIV infection  For pregnant patients, it is covered up to 3 times per pregnancy      Immunizations:  Immunization Recommendations   Influenza Vaccine Annual influenza vaccination during flu season is recommended for all persons aged >= 6 months who do not have contraindications   Pneumococcal Vaccine (Prevnar and Pneumovax)  * Prevnar = PCV13  * Pneumovax = PPSV23 Adults 25-60 years old: 1-3 doses may be recommended based on certain risk factors  Adults 72 years old: Prevnar (PCV13) vaccine recommended followed by Pneumovax (PPSV23) vaccine  If already received PPSV23 since turning 65, then PCV13 recommended at least one year after PPSV23 dose  Hepatitis B Vaccine 3 dose series if at intermediate or high risk (ex: diabetes, end stage renal disease, liver disease)   Tetanus (Td) Vaccine - COST NOT COVERED BY MEDICARE PART B Following completion of primary series, a booster dose should be given every 10 years to maintain immunity against tetanus  Td may also be given as tetanus wound prophylaxis  Tdap Vaccine - COST NOT COVERED BY MEDICARE PART B Recommended at least once for all adults  For pregnant patients, recommended with each pregnancy  Shingles Vaccine (Shingrix) - COST NOT COVERED BY MEDICARE PART B  2 shot series recommended in those aged 48 and above     Health Maintenance Due:  There are no preventive care reminders to display for this patient  Immunizations Due:  There are no preventive care reminders to display for this patient  Advance Directives   What are advance directives? Advance directives are legal documents that state your wishes and plans for medical care  These plans are made ahead of time in case you lose your ability to make decisions for yourself  Advance directives can apply to any medical decision, such as the treatments you want, and if you want to donate organs  What are the types of advance directives? There are many types of advance directives, and each state has rules about how to use them  You may choose a combination of any of the following:  · Living will: This is a written record of the treatment you want  You can also choose which treatments you do not want, which to limit, and which to stop at a certain time  This includes surgery, medicine, IV fluid, and tube feedings     · Durable power of  for healthcare [facility]): This is a written record that states who you want to make healthcare choices for you when you are unable to make them for yourself  This person, called a proxy, is usually a family member or a friend  You may choose more than 1 proxy  · Do not resuscitate (DNR) order:  A DNR order is used in case your heart stops beating or you stop breathing  It is a request not to have certain forms of treatment, such as CPR  A DNR order may be included in other types of advance directives  · Medical directive: This covers the care that you want if you are in a coma, near death, or unable to make decisions for yourself  You can list the treatments you want for each condition  Treatment may include pain medicine, surgery, blood transfusions, dialysis, IV or tube feedings, and a ventilator (breathing machine)  · Values history: This document has questions about your views, beliefs, and how you feel and think about life  This information can help others choose the care that you would choose  Why are advance directives important? An advance directive helps you control your care  Although spoken wishes may be used, it is better to have your wishes written down  Spoken wishes can be misunderstood, or not followed  Treatments may be given even if you do not want them  An advance directive may make it easier for your family to make difficult choices about your care  Weight Management   Why it is important to manage your weight:  Being overweight increases your risk of health conditions such as heart disease, high blood pressure, type 2 diabetes, and certain types of cancer  It can also increase your risk for osteoarthritis, sleep apnea, and other respiratory problems  Aim for a slow, steady weight loss  Even a small amount of weight loss can lower your risk of health problems  How to lose weight safely:  A safe and healthy way to lose weight is to eat fewer calories and get regular exercise   You can lose up about 1 pound a week by decreasing the number of calories you eat by 500 calories each day  Healthy meal plan for weight management:  A healthy meal plan includes a variety of foods, contains fewer calories, and helps you stay healthy  A healthy meal plan includes the following:  · Eat whole-grain foods more often  A healthy meal plan should contain fiber  Fiber is the part of grains, fruits, and vegetables that is not broken down by your body  Whole-grain foods are healthy and provide extra fiber in your diet  Some examples of whole-grain foods are whole-wheat breads and pastas, oatmeal, brown rice, and bulgur  · Eat a variety of vegetables every day  Include dark, leafy greens such as spinach, kale, dorothy greens, and mustard greens  Eat yellow and orange vegetables such as carrots, sweet potatoes, and winter squash  · Eat a variety of fruits every day  Choose fresh or canned fruit (canned in its own juice or light syrup) instead of juice  Fruit juice has very little or no fiber  · Eat low-fat dairy foods  Drink fat-free (skim) milk or 1% milk  Eat fat-free yogurt and low-fat cottage cheese  Try low-fat cheeses such as mozzarella and other reduced-fat cheeses  · Choose meat and other protein foods that are low in fat  Choose beans or other legumes such as split peas or lentils  Choose fish, skinless poultry (chicken or turkey), or lean cuts of red meat (beef or pork)  Before you cook meat or poultry, cut off any visible fat  · Use less fat and oil  Try baking foods instead of frying them  Add less fat, such as margarine, sour cream, regular salad dressing and mayonnaise to foods  Eat fewer high-fat foods  Some examples of high-fat foods include french fries, doughnuts, ice cream, and cakes  · Eat fewer sweets  Limit foods and drinks that are high in sugar  This includes candy, cookies, regular soda, and sweetened drinks  Exercise:  Exercise at least 30 minutes per day on most days of the week   Some examples of exercise include walking, biking, dancing, and swimming  You can also fit in more physical activity by taking the stairs instead of the elevator or parking farther away from stores  Ask your healthcare provider about the best exercise plan for you  © Copyright [employer] 2018 Information is for End User's use only and may not be sold, redistributed or otherwise used for commercial purposes   All illustrations and images included in CareNotes® are the copyrighted property of A D A M , Inc  or [address] [place] [address]

## 2021-04-14 NOTE — ASSESSMENT & PLAN NOTE
Pacemaker inserted 2018 due to bradycardia  Patient subsequently had ICD placed in 2019    Continue follow-up with his cardiologist at [facility]

## 2021-04-14 NOTE — PROGRESS NOTES
Assessment and Plan:    MEDICARE WELLNESS VISIT    Problem List Items Addressed This Visit     Coronary artery disease involving native heart without angina pectoris - Primary    Essential hypertension    Mixed hyperlipidemia    Gout    Pacemaker    History of kidney stones    Urinary incontinence           Preventive health issues were discussed with patient, and age appropriate screening tests were ordered as noted in patient's After Visit Summary  Personalized health advice and appropriate referrals for health education or preventive services given if needed, as noted in patient's After Visit Summary  History of Present Illness:     Patient presents for Welcome to Medicare visit  Patient Care Team:  Brittney Xiong DO as PCP - General (Family Medicine)     Review of Systems:     Review of Systems   Problem List:     Patient Active Problem List   Diagnosis    Coronary artery disease involving native heart without angina pectoris    Essential hypertension    Mixed hyperlipidemia    Gout    Pacemaker    History of kidney stones    Urinary incontinence    Cardiomyopathy ([facility] [address] )    History of implantable cardiac defibrillator (ICD)    Presence of biventricular AICD    Hx of CABG    Complete heart block ([facility] [address] )      Past Medical and Surgical History:     Past Medical History:   Diagnosis Date    Gout     Hypertension     Hypertension     Renal calculi      Past Surgical History:   Procedure Laterality Date    BASAL CELL CARCINOMA EXCISION  2021    nose    CARDIAC PACEMAKER PLACEMENT      CORONARY ARTERY BYPASS GRAFT        Family History:     History reviewed  No pertinent family history     Social History:        Social History     Socioeconomic History    Marital status:      Spouse name: None    Number of children: None    Years of education: None    Highest education level: None   Occupational History    None   Social Needs    Financial resource strain: None    Food insecurity Worry: None     Inability: None    Transportation needs     Medical: None     Non-medical: None   Tobacco Use    Smoking status: Never Smoker    Smokeless tobacco: Never Used   Substance and Sexual Activity    Alcohol use: Yes     Drinks per session: 1 or 2    Drug use: Never    Sexual activity: None   Lifestyle    Physical activity     Days per week: None     Minutes per session: None    Stress: None   Relationships    Social connections     Talks on phone: None     Gets together: None     Attends [employer]ism service: None     Active member of club or organization: None     Attends meetings of clubs or organizations: None     Relationship status: None    Intimate partner violence     Fear of current or ex partner: None     Emotionally abused: None     Physically abused: None     Forced sexual activity: None   Other Topics Concern    None   Social History Narrative    None      Medications and Allergies:     Current Outpatient Medications   Medication Sig Dispense Refill    allopurinol (ZYLOPRIM) 300 mg tablet TAKE 1 TABLET BY MOUTH  DAILY 60 tablet 5    aspirin 81 mg chewable tablet Chew 81 mg daily      B Complex Vitamins (VITAMIN B COMPLEX 100 IJ) Vitamin B Complex-C capsule   daily      Cholecalciferol (Vitamin D3) 25 MCG (1000 UT) CAPS Take 1 capsule by mouth daily      dutasteride (AVODART) 0 5 mg capsule dutasteride 0 5 mg capsule   every three days      Fesoterodine Fumarate ER 4 MG TB24 fesoterodine ER 4 mg tablet,extended release 24 hr   daily      finasteride (PROSCAR) 5 mg tablet finasteride 5 mg tablet   daily      fluticasone (FLONASE) 50 mcg/act nasal spray 2 sprays into each nostril daily 3 Bottle 1    fluticasone (Flovent Diskus) 50 MCG/BLIST diskus inhaler Inhale 2 puffs Daily      hydrochlorothiazide (HYDRODIURIL) 25 mg tablet Take 25 mg by mouth daily       lisinopril (ZESTRIL) 20 mg tablet Take 1 tablet (20 mg total) by mouth 2 (two) times a day 90 tablet 1    meclizine (ANTIVERT) 25 mg tablet Take 25 mg by mouth 2 (two) times a day as needed  1    metoprolol succinate (TOPROL-XL) 25 mg 24 hr tablet Take 1 tablet (25 mg total) by mouth daily 90 tablet 3    Mirabegron ER 50 MG TB24 Take 1 tablet (50 mg total) by mouth daily 30 tablet 5    multivitamin (THERAGRAN) TABS Take 1 tablet by mouth daily      niacin 500 mg tablet Take 500 mg by mouth daily with breakfast      Omega 3 1000 MG CAPS Take 2 capsules by mouth daily      oxyCODONE-acetaminophen (Percocet)  mg per tablet Take by mouth every 6 (six) hours      rosuvastatin (CRESTOR) 20 MG tablet Take 20 mg by mouth daily      tamsulosin (FLOMAX) 0 4 mg tamsulosin 0 4 mg capsule   daily      VITAMIN E PO Take 1 tablet by mouth daily      acetaminophen (TYLENOL) 650 mg CR tablet acetaminophen  mg tablet,extended release      Docosahexaenoic Acid--270 MG CAPS Take 2 capsules by mouth       No current facility-administered medications for this visit  Allergies   Allergen Reactions    Amoxicillin       Immunizations:     Immunization History   Administered Date(s) Administered    Influenza, high dose seasonal 0 7 mL 09/17/2020    SARS-CoV-2 / COVID-19 mRNA IM ([facility] [place]) 01/22/2021, 02/18/2021      Health Maintenance: There are no preventive care reminders to display for this patient  There are no preventive care reminders to display for this patient  Medicare Screening Tests and Risk Assessments:     Hemant Allen is here for his Subsequent Wellness visit  Last Medicare Wellness visit information reviewed, patient interviewed and updates made to the record today  Health Risk Assessment:   Patient rates overall health as good  Patient feels that their physical health rating is same  Patient is satisfied with their life  Eyesight was rated as same  Hearing was rated as same  Patient feels that their emotional and mental health rating is same  Patients states they are never, rarely angry   Patient states they are never, rarely unusually tired/fatigued  Pain experienced in the last 7 days has been none  Patient states that he has experienced no weight loss or gain in last 6 months  Depression Screening:   PHQ-2 Score: 0      Fall Risk Screening: In the past year, patient has experienced: no history of falling in past year      Home Safety:  Patient does not have trouble with stairs inside or outside of their home  Patient has working smoke alarms and has working carbon monoxide detector  Home safety hazards include: none  Nutrition:   Current diet is Regular  Medications:   Patient is currently taking over-the-counter supplements  OTC medications include: see medication list  Patient is able to manage medications  Activities of Daily Living (ADLs)/Instrumental Activities of Daily Living (IADLs):   Walk and transfer into and out of bed and chair?: Yes  Dress and groom yourself?: Yes    Bathe or shower yourself?: Yes    Feed yourself? Yes  Do your laundry/housekeeping?: Yes  Manage your money, pay your bills and track your expenses?: Yes  Make your own meals?: Yes    Do your own shopping?: Yes    Previous Hospitalizations:   Any hospitalizations or ED visits within the last 12 months?: Yes      Advance Care Planning:   Living will: Yes    Durable POA for healthcare:  Yes    Advanced directive: Yes    Advanced directive counseling given: Yes    Five wishes given: Yes    End of Life Decisions reviewed with patient: Yes    Provider agrees with end of life decisions: Yes      Cognitive Screening:   Provider or family/friend/caregiver concerned regarding cognition?: No    PREVENTIVE SCREENINGS      Cardiovascular Screening:    General: Screening Not Indicated and History Lipid Disorder      Diabetes Screening:     General: Risks and Benefits Discussed      Colorectal Cancer Screening:     General: Risks and Benefits Discussed      Prostate Cancer Screening:    General: Screening Not Indicated Osteoporosis Screening:    General: Risks and Benefits Discussed      Abdominal Aortic Aneurysm (AAA) Screening:        General: Risks and Benefits Discussed      Lung Cancer Screening:     General: Screening Not Indicated      Hepatitis C Screening:    General: Risks and Benefits Discussed    No exam data present     Physical Exam:     /80 (BP Location: Left arm, Patient Position: Sitting, Cuff Size: Large)   Pulse 67   Temp 97 6 °F (36 4 °C) (Tympanic)   Resp 16   Ht 5' 11 65" (1 82 m)   Wt 123 kg (271 lb)   SpO2 97%   BMI 37 11 kg/m²     Physical Exam     Sandip Bourne DO

## 2021-04-14 NOTE — PROGRESS NOTES
[facility]      NAME: Luis Wolf  AGE: 67 y o  SEX: male  : 1945   MRN: [id number]    DATE: 2021  TIME: 8:43 AM    Assessment and Plan     Problem List Items Addressed This Visit     Coronary artery disease involving native heart without angina pectoris - Primary      Status post 5 vessel CABG patient also has pacemaker and ICD  Doing well without chest pain or shortness of breath  Continue follow-up with his cardiologist at [address] [place] [address]         Essential hypertension      Controlled on lisinopril 20 and metoprolol 25 b i d  Relevant Orders    CBC and differential    TSH, 3rd generation with Free T4 reflex    Mixed hyperlipidemia      Doing well on rosuvastatin 20 mg daily         Relevant Orders    Comprehensive metabolic panel    Lipid Panel with Direct LDL reflex    Gout      Has been stable on allopurinol 300  Will recheck uric acid prior to next appointment         Relevant Orders    Uric acid    Pacemaker      Pacemaker inserted  due to bradycardia  Patient subsequently had ICD placed in   Continue follow-up with his cardiologist at [address] [place] [address]         History of kidney stones      Being followed by Urology         Urinary incontinence      Stable on Myrbetriq 50 mg daily         Elevated blood sugar      Blood sugar from 2032  Patient has gained weight during recent trip to [place]  Discussed diet exercise  Will check A1c prior to next appointment         Relevant Orders    Comprehensive metabolic panel    Hemoglobin A1C    Gastroesophageal reflux disease without esophagitis      Recent reflux symptoms  Patient borrowed omeprazole capsule from his sister recently which resolved symptoms  Will give Rx for 20 mg daily    Call if further problems         Relevant Medications    omeprazole (PriLOSEC) 20 mg delayed release capsule    Arthralgia of right hand      Patient has been complaining of stiffness and pain in his right hand lately  On examination there is swelling along the 3rd digit  Will sent for x-rays  Recommend starting glucosamine 1500 mg daily  Consider referral to physical therapy         Relevant Orders    XR hand 3+ vw right    Obesity, morbid (HCC)      Current BMI 37  11  Discussed diet exercise  Will continue to monitor           Other Visit Diagnoses     Hematuria, unspecified type        Relevant Orders    Ambulatory referral to Urology    Screening for prostate cancer        Relevant Orders    PSA, Total Screen         will call with x-ray results      Return to office in:  6 months, fasting blood work prior at [facility] Complaint   Patient presents with    Medicare Wellness Visit    Follow-up     6 months       History of Present Illness      Patient presents for recheck of chronic medical problems today  He has been complaining of arthritis pain in his right hand for quite some time  Also complaining of reflux symptoms  He borrowed his sisters omeprazole capsule with good effect  Patient also had recent bout of hematuria ( which has since resolved)  Saw his urologist at [facility], but has to wait 6 weeks for next appointment  He is looking for local referral   Otherwise still sees cardiologist regularly  Patient had labs done on February 26      The following portions of the patient's history were reviewed and updated as appropriate: allergies, current medications, past family history, past medical history, past social history, past surgical history and problem list     Review of Systems   Review of Systems   Respiratory: Negative  Cardiovascular: Negative  Gastrointestinal: Negative  Genitourinary: Positive for hematuria  Musculoskeletal: Positive for arthralgias         Active Problem List     Patient Active Problem List   Diagnosis    Coronary artery disease involving native heart without angina pectoris    Essential hypertension    Mixed hyperlipidemia    Gout    Pacemaker    History of kidney stones    Urinary incontinence    Cardiomyopathy ([facility] )    History of implantable cardiac defibrillator (ICD)    Presence of biventricular AICD    Hx of CABG    Complete heart block (HCC)    Elevated blood sugar    Gastroesophageal reflux disease without esophagitis    Arthralgia of right hand    Obesity, morbid (HCC)       Objective   /80 (BP Location: Left arm, Patient Position: Sitting, Cuff Size: Large)   Pulse 67   Temp 97 6 °F (36 4 °C) (Tympanic)   Resp 16   Ht 5' 11 65" (1 82 m)   Wt 123 kg (271 lb)   SpO2 97%   BMI 37 11 kg/m²     Physical Exam  Cardiovascular:      Rate and Rhythm: Normal rate and regular rhythm  Heart sounds: Normal heart sounds  Comments: Carotids: no bruits  Ext: no edema  Pulmonary:      Effort: Pulmonary effort is normal  No respiratory distress  Breath sounds: No wheezing or rales  Psychiatric:         Behavior: Behavior normal          Thought Content:  Thought content normal          Pertinent Laboratory/Diagnostic Studies:  LABS FEBRUARY 26TH    Current Medications     Current Outpatient Medications:     allopurinol (ZYLOPRIM) 300 mg tablet, TAKE 1 TABLET BY MOUTH  DAILY, Disp: 60 tablet, Rfl: 5    aspirin 81 mg chewable tablet, Chew 81 mg daily, Disp: , Rfl:     B Complex Vitamins (VITAMIN B COMPLEX 100 IJ), Vitamin B Complex-C capsule  daily, Disp: , Rfl:     Cholecalciferol (Vitamin D3) 25 MCG (1000 UT) CAPS, Take 1 capsule by mouth daily, Disp: , Rfl:     dutasteride (AVODART) 0 5 mg capsule, dutasteride 0 5 mg capsule  every three days, Disp: , Rfl:     Fesoterodine Fumarate ER 4 MG TB24, fesoterodine ER 4 mg tablet,extended release 24 hr  daily, Disp: , Rfl:     finasteride (PROSCAR) 5 mg tablet, finasteride 5 mg tablet  daily, Disp: , Rfl:     fluticasone (FLONASE) 50 mcg/act nasal spray, 2 sprays into each nostril daily, Disp: 3 Bottle, Rfl: 1   fluticasone (Flovent Diskus) 50 MCG/BLIST diskus inhaler, Inhale 2 puffs Daily, Disp: , Rfl:     hydrochlorothiazide (HYDRODIURIL) 25 mg tablet, Take 25 mg by mouth daily , Disp: , Rfl:     lisinopril (ZESTRIL) 20 mg tablet, Take 1 tablet (20 mg total) by mouth 2 (two) times a day, Disp: 90 tablet, Rfl: 1    meclizine (ANTIVERT) 25 mg tablet, Take 25 mg by mouth 2 (two) times a day as needed, Disp: , Rfl: 1    metoprolol succinate (TOPROL-XL) 25 mg 24 hr tablet, Take 1 tablet (25 mg total) by mouth daily, Disp: 90 tablet, Rfl: 3    Mirabegron ER 50 MG TB24, Take 1 tablet (50 mg total) by mouth daily, Disp: 30 tablet, Rfl: 5    multivitamin (THERAGRAN) TABS, Take 1 tablet by mouth daily, Disp: , Rfl:     niacin 500 mg tablet, Take 500 mg by mouth daily with breakfast, Disp: , Rfl:     Omega 3 1000 MG CAPS, Take 2 capsules by mouth daily, Disp: , Rfl:     oxyCODONE-acetaminophen (Percocet)  mg per tablet, Take by mouth every 6 (six) hours, Disp: , Rfl:     rosuvastatin (CRESTOR) 20 MG tablet, Take 20 mg by mouth daily, Disp: , Rfl:     tamsulosin (FLOMAX) 0 4 mg, tamsulosin 0 4 mg capsule  daily, Disp: , Rfl:     VITAMIN E PO, Take 1 tablet by mouth daily, Disp: , Rfl:     acetaminophen (TYLENOL) 650 mg CR tablet, acetaminophen  mg tablet,extended release, Disp: , Rfl:     omeprazole (PriLOSEC) 20 mg delayed release capsule, Take 1 capsule (20 mg total) by mouth daily, Disp: 90 capsule, Rfl: 1    Health Maintenance     Health Maintenance   Topic Date Due    Medicare Annual Wellness Visit (AWV)  Never done    BMI: Followup Plan  Never done    Depression Screening PHQ  09/17/2021    Fall Risk  04/14/2022    BMI: Adult  04/14/2022    DTaP,Tdap,and Td Vaccines (2 - Td) 04/17/2027    Pneumococcal Vaccine: 65+ Years  Completed    Influenza Vaccine  Completed    COVID-19 Vaccine  Completed    HIB Vaccine  Aged Out    Hepatitis B Vaccine  Aged Out    IPV Vaccine  Aged Out    Hepatitis A Vaccine  Aged Out    Meningococcal ACWY Vaccine  Aged Out    HPV Vaccine  Aged Out     Immunization History   Administered Date(s) Administered    Influenza, high dose seasonal 0 7 mL 09/17/2020    SARS-CoV-2 / COVID-19 mRNA IM (Moderna) 01/22/2021, 02/18/2021       Owen Trejo DO  [facility]

## 2021-04-26 ENCOUNTER — TELEPHONE (OUTPATIENT)
Dept: UROLOGY | Facility: AMBULATORY SURGERY CENTER | Age: 76
End: 2021-04-26

## 2021-04-26 NOTE — TELEPHONE ENCOUNTER
Patient calling to get an appointment with Dr Chika Guadalupe  He said his family Dr Kemar Milian does not want him waiting for 6 plus months in order to get this procedure done  I was not seeing any new patient appointments available with Dr Chika Guadalupe  Please advise on urgency of appointment - all records are in [facility] [facility]  Reason for appointment/Complaint/Diagnosis : cysto/trus/uflow/pvr/ipss      Insurance: medicare / aarp    Previous urologist? [facility] Dr Elin Anaya     Records requested/where? Records in [facility]    Outside testing/where? lv    Location Preference for office visit?  [place]

## 2021-04-27 ENCOUNTER — APPOINTMENT (OUTPATIENT)
Dept: RADIOLOGY | Facility: CLINIC | Age: 76
End: 2021-04-27
Payer: MEDICARE

## 2021-04-27 DIAGNOSIS — M25.541 ARTHRALGIA OF RIGHT HAND: ICD-10-CM

## 2021-04-27 PROCEDURE — 73130 X-RAY EXAM OF HAND: CPT

## 2021-04-29 ENCOUNTER — TELEPHONE (OUTPATIENT)
Dept: FAMILY MEDICINE CLINIC | Facility: CLINIC | Age: 76
End: 2021-04-29

## 2021-04-29 NOTE — TELEPHONE ENCOUNTER
Pt called back and I scheduled appt for 06/25/2021  Pt was advise to obtain prior medical records      Just an [facility]

## 2021-04-29 NOTE — TELEPHONE ENCOUNTER
Bw orders faxed to [facility] at fax # [phone]  Notified pt he wanted to get labs drawn today  Expected date is 10/1 gave verbal to [facility] on vm asked them to call back if new orders are needed  Fax confirmation file #[id number]

## 2021-04-30 LAB — HBA1C MFR BLD HPLC: 6.5 %

## 2021-05-12 ENCOUNTER — TELEPHONE (OUTPATIENT)
Dept: FAMILY MEDICINE CLINIC | Facility: CLINIC | Age: 76
End: 2021-05-12

## 2021-05-12 NOTE — TELEPHONE ENCOUNTER
Call patient    He needs to ask his cardiologist   All if his cardiologist approves, I will prescribe it

## 2021-05-12 NOTE — TELEPHONE ENCOUNTER
Phone call from patient requesting a prescription for Viagra 100 mg  He states he has been on it in the past, but from a previous doctor  He has a hx of heart disease  Do you want him to come into office to discuss this with him? Asking for it to be sent to [facility]

## 2021-05-13 ENCOUNTER — TELEPHONE (OUTPATIENT)
Dept: CARDIOLOGY CLINIC | Facility: CLINIC | Age: 76
End: 2021-05-13

## 2021-05-13 NOTE — TELEPHONE ENCOUNTER
PCP office reaching out asking if pt may take Viagra from a cardiac perspective? If ok, they will prescribe

## 2021-05-15 NOTE — TELEPHONE ENCOUNTER
Please notify patient that his cardiologist has cleared him to use Viagra  But he cannot use this medication while nitroglycerin it is in his system  If he would like me to prescribe, would pharmacy?

## 2021-05-20 DIAGNOSIS — N52.9 ERECTILE DYSFUNCTION, UNSPECIFIED ERECTILE DYSFUNCTION TYPE: Primary | ICD-10-CM

## 2021-05-20 RX ORDER — SILDENAFIL 50 MG/1
50 TABLET, FILM COATED ORAL DAILY PRN
Qty: 10 TABLET | Refills: 2 | Status: SHIPPED | OUTPATIENT
Start: 2021-05-20

## 2021-05-20 NOTE — TELEPHONE ENCOUNTER
Spoke with patient he said he is not taking nitroglycerin  Please call in for viagra to [facility] mail  Thank you

## 2021-05-28 ENCOUNTER — REMOTE DEVICE CLINIC VISIT (OUTPATIENT)
Dept: CARDIOLOGY CLINIC | Facility: CLINIC | Age: 76
End: 2021-05-28
Payer: MEDICARE

## 2021-05-28 DIAGNOSIS — Z95.810 AICD (AUTOMATIC CARDIOVERTER/DEFIBRILLATOR) PRESENT: Primary | ICD-10-CM

## 2021-05-28 PROCEDURE — 93296 REM INTERROG EVL PM/IDS: CPT | Performed by: INTERNAL MEDICINE

## 2021-05-28 PROCEDURE — 93295 DEV INTERROG REMOTE 1/2/MLT: CPT | Performed by: INTERNAL MEDICINE

## 2021-05-28 NOTE — PROGRESS NOTES
Results for orders placed or performed in visit on 05/28/21   Cardiac EP device report    Narrative    SJM/Bi-V ICD (DDD MODE)/ NOT MRI CONDITIONAL  MERLIN TRANSMISSION: BATTERY VOLTAGE ADEQUATE (5 3 YRS)  AP-28%, BVP-96% ALL AVAILABLE LEAD PARAMETERS WITHIN NORMAL LIMITS  NO NEW VHR EPISODES  8 NEW AMS EPISODES MAX DURATION 10 SEC- PAT ON EGM'S  PT ON ASA 81MG & METOPROLOL  CORVUE IMPEDANCE MONITORING WITHIN NORMAL LIMITS  NORMAL DEVICE FUNCTION   GV

## 2021-06-03 DIAGNOSIS — I10 ESSENTIAL HYPERTENSION: ICD-10-CM

## 2021-06-03 RX ORDER — LISINOPRIL 20 MG/1
TABLET ORAL
Qty: 180 TABLET | Refills: 3 | Status: SHIPPED | OUTPATIENT
Start: 2021-06-03 | End: 2022-04-04

## 2021-06-10 ENCOUNTER — TELEPHONE (OUTPATIENT)
Dept: FAMILY MEDICINE CLINIC | Facility: CLINIC | Age: 76
End: 2021-06-10

## 2021-08-29 DIAGNOSIS — I25.10 CORONARY ARTERY DISEASE INVOLVING NATIVE HEART WITHOUT ANGINA PECTORIS, UNSPECIFIED VESSEL OR LESION TYPE: ICD-10-CM

## 2021-08-30 RX ORDER — METOPROLOL SUCCINATE 25 MG/1
TABLET, EXTENDED RELEASE ORAL
Qty: 90 TABLET | Refills: 3 | Status: SHIPPED | OUTPATIENT
Start: 2021-08-30 | End: 2022-06-20

## 2021-11-25 DIAGNOSIS — M10.9 GOUT, UNSPECIFIED CAUSE, UNSPECIFIED CHRONICITY, UNSPECIFIED SITE: ICD-10-CM

## 2021-11-26 RX ORDER — ALLOPURINOL 300 MG/1
TABLET ORAL
Qty: 90 TABLET | Refills: 3 | Status: SHIPPED | OUTPATIENT
Start: 2021-11-26

## 2022-04-03 DIAGNOSIS — I10 ESSENTIAL HYPERTENSION: ICD-10-CM

## 2022-04-04 NOTE — TELEPHONE ENCOUNTER
Requested medication(s) are due for refill today: Yes  Patient has already received a courtesy refill: No  Other reason request has been forwarded to provider:    Cardiovascular:  ACE Inhibitors Failed 04/03/2022 04:30 AM   Protocol Details  BP completed in the last 6 months    Cr in normal range and within 360 days    K in normal range and within 360 days    Valid encounter within last 6 months

## 2022-04-05 RX ORDER — LISINOPRIL 20 MG/1
TABLET ORAL
Qty: 180 TABLET | Refills: 0 | Status: SHIPPED | OUTPATIENT
Start: 2022-04-05

## 2022-04-26 ENCOUNTER — TELEPHONE (OUTPATIENT)
Dept: FAMILY MEDICINE CLINIC | Facility: CLINIC | Age: 77
End: 2022-04-26

## 2022-06-17 DIAGNOSIS — I25.10 CORONARY ARTERY DISEASE INVOLVING NATIVE HEART WITHOUT ANGINA PECTORIS, UNSPECIFIED VESSEL OR LESION TYPE: ICD-10-CM

## 2022-06-20 RX ORDER — METOPROLOL SUCCINATE 25 MG/1
TABLET, EXTENDED RELEASE ORAL
Qty: 90 TABLET | Refills: 0 | Status: SHIPPED | OUTPATIENT
Start: 2022-06-20